# Patient Record
Sex: MALE | Race: WHITE | Employment: UNEMPLOYED | URBAN - METROPOLITAN AREA
[De-identification: names, ages, dates, MRNs, and addresses within clinical notes are randomized per-mention and may not be internally consistent; named-entity substitution may affect disease eponyms.]

---

## 2021-06-01 ENCOUNTER — HOSPITAL ENCOUNTER (EMERGENCY)
Age: 21
Discharge: HOME | End: 2021-06-01

## 2021-06-01 ENCOUNTER — APPOINTMENT (OUTPATIENT)
Dept: GENERAL RADIOLOGY | Age: 21
End: 2021-06-01

## 2021-06-01 VITALS
TEMPERATURE: 98.3 F | WEIGHT: 150 LBS | HEART RATE: 78 BPM | BODY MASS INDEX: 21.47 KG/M2 | OXYGEN SATURATION: 98 % | SYSTOLIC BLOOD PRESSURE: 136 MMHG | HEIGHT: 70 IN | RESPIRATION RATE: 20 BRPM | DIASTOLIC BLOOD PRESSURE: 89 MMHG

## 2021-06-01 DIAGNOSIS — R20.8 DECREASED SENSATION OF HAND AND ARM: ICD-10-CM

## 2021-06-01 DIAGNOSIS — S51.812A LACERATION OF LEFT FOREARM, INITIAL ENCOUNTER: Primary | ICD-10-CM

## 2021-06-01 DIAGNOSIS — Z23 NEED FOR TDAP VACCINATION: ICD-10-CM

## 2021-06-01 PROCEDURE — 99283 EMERGENCY DEPT VISIT LOW MDM: CPT

## 2021-06-01 PROCEDURE — 73090 X-RAY EXAM OF FOREARM: CPT

## 2021-06-01 PROCEDURE — 90471 IMMUNIZATION ADMIN: CPT | Performed by: PHYSICIAN ASSISTANT

## 2021-06-01 PROCEDURE — 6360000002 HC RX W HCPCS: Performed by: PHYSICIAN ASSISTANT

## 2021-06-01 PROCEDURE — 90715 TDAP VACCINE 7 YRS/> IM: CPT | Performed by: PHYSICIAN ASSISTANT

## 2021-06-01 PROCEDURE — 12002 RPR S/N/AX/GEN/TRNK2.6-7.5CM: CPT

## 2021-06-01 RX ADMIN — TETANUS TOXOID, REDUCED DIPHTHERIA TOXOID AND ACELLULAR PERTUSSIS VACCINE, ADSORBED 0.5 ML: 5; 2.5; 8; 8; 2.5 SUSPENSION INTRAMUSCULAR at 09:05

## 2021-06-01 ASSESSMENT — PAIN DESCRIPTION - PAIN TYPE: TYPE: ACUTE PAIN

## 2021-06-01 ASSESSMENT — PAIN DESCRIPTION - ORIENTATION: ORIENTATION: LEFT

## 2021-06-01 ASSESSMENT — PAIN SCALES - GENERAL: PAINLEVEL_OUTOF10: 8

## 2021-06-01 ASSESSMENT — PAIN DESCRIPTION - LOCATION: LOCATION: ARM

## 2021-06-01 NOTE — ED NOTES
Dressed pt's lac on left forearm with small nonadherent gauze with a 4in soft gauze roll. CMS intact before and after application.       Montserrat Nunes  06/01/21 1053

## 2021-06-01 NOTE — ED PROVIDER NOTES
[facility] Emergency Department    CHIEF COMPLAINT  Laceration (left arm laceration)      SHARED SERVICE VISIT  Evaluated by DIGNA. My supervising physician was available for consultation. HISTORY OF PRESENT ILLNESS  Corinna Mcdonald is a 19 y.o. male who presents to the ED complaining of laceration to the left forearm. 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 It is unknown what exactly he cut his forearm on. Patient went to urgent care who recommended that he come the emergency department for eye evaluation given the severity of the lunate concerning for possible tendon or nerve injuries. At this time patient is reporting some numbness and tingling over the left fifth and fourth digit. He states he can move his fingers well but had some decreased sensation over that side of his hand. Denies any foreign body sensation or anticoagulation. Last tetanus is unknown. No other complaints, modifying factors or associated symptoms. Nursing notes reviewed. History reviewed. No pertinent past medical history. History reviewed. No pertinent surgical history. History reviewed. No pertinent family history.   Social History     Socioeconomic History    Marital status: Single     Spouse name: Not on file    Number of children: Not on file    Years of education: Not on file    Highest education level: Not on file   Occupational History    Not on file   Tobacco Use    Smoking status: Never Smoker    Smokeless tobacco: Current User     Types: Chew   Substance and Sexual Activity    Alcohol use: Not Currently    Drug use: Never    Sexual activity: Not on file   Other Topics Concern    Not on file   Social History Narrative    Not on file     Social Determinants of Health     Financial Resource Strain:     Difficulty of Paying Living Expenses:    Food Insecurity:     Worried About Running Out sensation of the ulnar/palmar aspect of the left hand compared to the medial aspect. Compartments are soft. Capillary refills less than 2 seconds. Skin is warm. SKIN: Warm and dry. 4 and half centimeter by 2 and half centimeter laceration over the anterior medial aspect of the left forearm  NEUROLOGICAL: Alert and oriented. CN's 2-12 intact. No gross facial drooping. Strength 5/5, sensation intact. PSYCHIATRIC: Normal mood and affect. RADIOLOGY  XR RADIUS ULNA LEFT (2 VIEWS)   Preliminary Result   1. No evidence of radiopaque foreign body. 2. No evidence of acute fracture. LABS  Labs Reviewed - No data to display    PROCEDURES  Unless otherwise noted below, none  Lac Repair    Date/Time: 6/1/2021 10:44 AM  Performed by: Rogelio Romo PA-C  Authorized by: Rogelio Romo PA-C     Consent:     Consent obtained:  Verbal    Consent given by:  Patient    Risks discussed:  Infection, pain, nerve damage, tendon damage and need for additional repair    Alternatives discussed:  No treatment  Anesthesia (see MAR for exact dosages):      Anesthesia method:  Local infiltration    Local anesthetic:  Lidocaine 1% w/o epi and sodium bicarbonate  Laceration details:     Location:  Shoulder/arm    Shoulder/arm location:  L lower arm    Length (cm):  4.5    Depth (mm):  15  Repair type:     Repair type:  Simple  Pre-procedure details:     Preparation:  Patient was prepped and draped in usual sterile fashion and imaging obtained to evaluate for foreign bodies  Exploration:     Wound exploration: wound explored through full range of motion and entire depth of wound probed and visualized      Wound extent comment:  No visualized nerve laceration or tendon laceration  Treatment:     Area cleansed with:  Hibiclens and saline    Amount of cleaning:  Extensive    Visualized foreign bodies/material removed: no    Skin repair:     Repair method:  Sutures    Suture size:  4-0    Suture material:  Nylon    Suture technique:  Simple interrupted    Number of sutures:  8  Approximation:     Approximation:  Close  Post-procedure details:     Dressing:  Adhesive bandage and antibiotic ointment    Patient tolerance of procedure: Tolerated well, no immediate complications        MDM  MDM  59-year-old male presents emergency department for evaluation of a laceration to his left forearm. Occurred at work by an unknown source while he was reaching his hand through a ceiling tile. Urgent care referred him to the emergency department for further evaluation. On arrival there is a 4-1/2 x 2-1/2 cm over the ulnar aspect of his mid left forearm. On exam of the his distal extremity he does report some decreased sensation over the ulnar oral aspect of his hand compared to the median aspect. Motor function appears to be intact; ever there is some \"numbness and tingling of the these 2 digits. However he does not report a difference in the medial versus the lateral aspect of his fourth digit. Was obtained which demonstrates no acute bony abnormality or foreign body. On further investigation of the laceration no ulnar nerve injury could be appreciated within the wound. No tendon injury appreciated within the wound. No foreign body appreciated within the wound. Laceration repair note above. Given his decrease in station recommend he follow-up with hand. I discussed at length the importance of following with hand surgery for further evaluation of a possible ulnar nerve injury. Discussed the risk for either repeat repair or further evaluation of the nerve function. Discussed the possibility of permanent decrease sensation and emphasized the importance of follow-up. This was updated and patient was discharged home with good wound precautions and follow-up with hand surgery. Instructed to return emergency department in 7 days for suture removal.  Was educated on signs of infection and was when to return.           DISPOSITION discharded home with good return precautions and follow-up with hand surgery    CLINICAL IMPRESSION  1. Laceration of left forearm, initial encounter    2. Decreased sensation of hand and arm    3.  Need for Tdap vaccination           Maria Teresa Holman PA-C  06/01/21 1041

## 2021-06-08 ENCOUNTER — TELEPHONE (OUTPATIENT)
Dept: ORTHOPEDIC SURGERY | Age: 21
End: 2021-06-08